# Patient Record
Sex: FEMALE | Race: WHITE | NOT HISPANIC OR LATINO | URBAN - METROPOLITAN AREA
[De-identification: names, ages, dates, MRNs, and addresses within clinical notes are randomized per-mention and may not be internally consistent; named-entity substitution may affect disease eponyms.]

---

## 2017-05-08 ENCOUNTER — OUTPATIENT (OUTPATIENT)
Dept: OUTPATIENT SERVICES | Facility: HOSPITAL | Age: 52
LOS: 1 days | End: 2017-05-08

## 2017-05-11 ENCOUNTER — INPATIENT (INPATIENT)
Facility: HOSPITAL | Age: 52
LOS: 4 days | Discharge: HOME | End: 2017-05-16
Payer: COMMERCIAL

## 2017-05-13 PROCEDURE — 71020: CPT | Mod: 26

## 2018-08-12 ENCOUNTER — TRANSCRIPTION ENCOUNTER (OUTPATIENT)
Age: 53
End: 2018-08-12

## 2018-11-26 ENCOUNTER — TRANSCRIPTION ENCOUNTER (OUTPATIENT)
Age: 53
End: 2018-11-26

## 2019-05-21 ENCOUNTER — APPOINTMENT (OUTPATIENT)
Dept: OBGYN | Facility: CLINIC | Age: 54
End: 2019-05-21
Payer: COMMERCIAL

## 2019-05-21 ENCOUNTER — APPOINTMENT (OUTPATIENT)
Dept: OBGYN | Facility: CLINIC | Age: 54
End: 2019-05-21

## 2019-05-21 VITALS
SYSTOLIC BLOOD PRESSURE: 118 MMHG | WEIGHT: 199 LBS | BODY MASS INDEX: 35.26 KG/M2 | HEIGHT: 63 IN | DIASTOLIC BLOOD PRESSURE: 70 MMHG

## 2019-05-21 DIAGNOSIS — Z87.440 PERSONAL HISTORY OF URINARY (TRACT) INFECTIONS: ICD-10-CM

## 2019-05-21 DIAGNOSIS — Z80.8 FAMILY HISTORY OF MALIGNANT NEOPLASM OF OTHER ORGANS OR SYSTEMS: ICD-10-CM

## 2019-05-21 DIAGNOSIS — Z80.0 FAMILY HISTORY OF MALIGNANT NEOPLASM OF DIGESTIVE ORGANS: ICD-10-CM

## 2019-05-21 DIAGNOSIS — Z80.1 FAMILY HISTORY OF MALIGNANT NEOPLASM OF TRACHEA, BRONCHUS AND LUNG: ICD-10-CM

## 2019-05-21 DIAGNOSIS — Z87.39 PERSONAL HISTORY OF OTHER DISEASES OF THE MUSCULOSKELETAL SYSTEM AND CONNECTIVE TISSUE: ICD-10-CM

## 2019-05-21 DIAGNOSIS — Z98.890 OTHER SPECIFIED POSTPROCEDURAL STATES: ICD-10-CM

## 2019-05-21 DIAGNOSIS — Z80.52 FAMILY HISTORY OF MALIGNANT NEOPLASM OF BLADDER: ICD-10-CM

## 2019-05-21 DIAGNOSIS — Z78.9 OTHER SPECIFIED HEALTH STATUS: ICD-10-CM

## 2019-05-21 DIAGNOSIS — Z80.3 FAMILY HISTORY OF MALIGNANT NEOPLASM OF BREAST: ICD-10-CM

## 2019-05-21 DIAGNOSIS — Z80.6 FAMILY HISTORY OF LEUKEMIA: ICD-10-CM

## 2019-05-21 DIAGNOSIS — Z80.7 FAMILY HISTORY OF OTHER MALIGNANT NEOPLASMS OF LYMPHOID, HEMATOPOIETIC AND RELATED TISSUES: ICD-10-CM

## 2019-05-21 DIAGNOSIS — Z85.820 PERSONAL HISTORY OF MALIGNANT MELANOMA OF SKIN: ICD-10-CM

## 2019-05-21 DIAGNOSIS — M72.2 PLANTAR FASCIAL FIBROMATOSIS: ICD-10-CM

## 2019-05-21 PROCEDURE — 99396 PREV VISIT EST AGE 40-64: CPT

## 2019-05-21 RX ORDER — MULTIVIT-MIN/FOLIC/VIT K/LYCOP 400-300MCG
TABLET ORAL
Refills: 0 | Status: ACTIVE | COMMUNITY

## 2019-05-21 NOTE — PHYSICAL EXAM
[Awake] : awake [Alert] : alert [Acute Distress] : no acute distress [Mass] : no breast mass [Nipple Discharge] : no nipple discharge [Axillary LAD] : no axillary lymphadenopathy [Soft] : soft [Tender] : non tender [Oriented x3] : oriented to person, place, and time [Normal] : uterus [No Bleeding] : there was no active vaginal bleeding [Uterine Adnexae] : were not tender and not enlarged [FreeTextEntry7] : hystrectomy

## 2019-05-21 NOTE — DISCUSSION/SUMMARY
[FreeTextEntry1] : a54 years old white female into the office for annual exam patient is 2 years post hysterectomy feeling well offers no complaints physical on pelvic exam within normal limits Pap smear done patient is on Premarin cream I spent 25 minutes with the patient

## 2019-05-24 ENCOUNTER — TRANSCRIPTION ENCOUNTER (OUTPATIENT)
Age: 54
End: 2019-05-24

## 2019-05-26 ENCOUNTER — RX RENEWAL (OUTPATIENT)
Age: 54
End: 2019-05-26

## 2019-05-26 LAB
CYTOLOGY CVX/VAG DOC THIN PREP: NORMAL
HPV HIGH+LOW RISK DNA PNL CVX: NOT DETECTED

## 2020-04-26 ENCOUNTER — MESSAGE (OUTPATIENT)
Age: 55
End: 2020-04-26

## 2020-05-12 ENCOUNTER — APPOINTMENT (OUTPATIENT)
Dept: DISASTER EMERGENCY | Facility: HOSPITAL | Age: 55
End: 2020-05-12

## 2020-05-12 LAB
SARS-COV-2 IGG SERPL IA-ACNC: 0.3 INDEX
SARS-COV-2 IGG SERPL QL IA: NEGATIVE

## 2020-07-28 ENCOUNTER — APPOINTMENT (OUTPATIENT)
Dept: OBGYN | Facility: CLINIC | Age: 55
End: 2020-07-28
Payer: COMMERCIAL

## 2020-07-28 VITALS
WEIGHT: 189 LBS | SYSTOLIC BLOOD PRESSURE: 122 MMHG | DIASTOLIC BLOOD PRESSURE: 78 MMHG | BODY MASS INDEX: 33.49 KG/M2 | TEMPERATURE: 97.2 F | HEIGHT: 63 IN

## 2020-07-28 DIAGNOSIS — Z82.49 FAMILY HISTORY OF ISCHEMIC HEART DISEASE AND OTHER DISEASES OF THE CIRCULATORY SYSTEM: ICD-10-CM

## 2020-07-28 DIAGNOSIS — Z82.62 FAMILY HISTORY OF OSTEOPOROSIS: ICD-10-CM

## 2020-07-28 DIAGNOSIS — Z83.49 FAMILY HISTORY OF OTHER ENDOCRINE, NUTRITIONAL AND METABOLIC DISEASES: ICD-10-CM

## 2020-07-28 DIAGNOSIS — Z00.00 ENCOUNTER FOR GENERAL ADULT MEDICAL EXAMINATION W/OUT ABNORMAL FINDINGS: ICD-10-CM

## 2020-07-28 DIAGNOSIS — Z82.0 FAMILY HISTORY OF EPILEPSY AND OTHER DISEASES OF THE NERVOUS SYSTEM: ICD-10-CM

## 2020-07-28 LAB
DATE COLLECTED: NORMAL
HEMOCCULT SP1 STL QL: NEGATIVE
QUALITY CONTROL: YES

## 2020-07-28 PROCEDURE — 99396 PREV VISIT EST AGE 40-64: CPT

## 2020-07-28 PROCEDURE — 82274 ASSAY TEST FOR BLOOD FECAL: CPT | Mod: NC,QW

## 2020-07-28 RX ORDER — LISDEXAMFETAMINE DIMESYLATE 10 MG/1
CAPSULE ORAL
Refills: 0 | Status: ACTIVE | COMMUNITY

## 2020-07-28 RX ORDER — CELECOXIB 50 MG/1
CAPSULE ORAL
Refills: 0 | Status: DISCONTINUED | COMMUNITY
End: 2020-07-28

## 2020-07-28 RX ORDER — MONTELUKAST SODIUM 10 MG/1
10 TABLET, FILM COATED ORAL
Refills: 0 | Status: DISCONTINUED | COMMUNITY
End: 2020-07-28
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
## 2020-07-28 NOTE — PHYSICAL EXAM
[Awake] : awake [Alert] : alert [Acute Distress] : no acute distress [Mass] : no breast mass [Nipple Discharge] : no nipple discharge [Axillary LAD] : no axillary lymphadenopathy [Soft] : soft [Tender] : non tender [Oriented x3] : oriented to person, place, and time [Normal] : vagina [No Bleeding] : there was no active vaginal bleeding [Absent] : absent [Uterine Adnexae] : were not tender and not enlarged [No Tenderness] : no rectal tenderness [CTAB] : CTAB [Occult Blood] : occult blood test from digital rectal exam was negative [RRR, No Murmurs] : RRR, no murmurs

## 2020-07-29 LAB — HPV HIGH+LOW RISK DNA PNL CVX: NOT DETECTED

## 2020-07-31 LAB — CYTOLOGY CVX/VAG DOC THIN PREP: NORMAL

## 2020-12-18 ENCOUNTER — APPOINTMENT (OUTPATIENT)
Dept: OBGYN | Facility: CLINIC | Age: 55
End: 2020-12-18
Payer: COMMERCIAL

## 2020-12-18 VITALS
WEIGHT: 189 LBS | DIASTOLIC BLOOD PRESSURE: 72 MMHG | BODY MASS INDEX: 27.99 KG/M2 | SYSTOLIC BLOOD PRESSURE: 118 MMHG | HEIGHT: 69 IN

## 2020-12-18 DIAGNOSIS — Z13.71 ENCOUNTER FOR NONPROCREATIVE SCREENING FOR GENETIC DISEASE CARRIER STATUS: ICD-10-CM

## 2020-12-18 PROCEDURE — 99072 ADDL SUPL MATRL&STAF TM PHE: CPT

## 2020-12-18 PROCEDURE — 99212 OFFICE O/P EST SF 10 MIN: CPT

## 2020-12-18 NOTE — DISCUSSION/SUMMARY
[FreeTextEntry1] : DIscussed our current genetic test panel which tests for the test BRCA 1+2 and the TP53 mutation.\par Collecting blood today and will send to [facility]

## 2020-12-18 NOTE — HISTORY OF PRESENT ILLNESS
[FreeTextEntry1] : Yazmin presents today for genetic testing having learned recently that her sister is + for the TP53 gene mutation. That sister has breast cancer. She has a lot of cancer in her family hx including, breast, colon, skin,lung,lymphoma and bladder.\par Pt is up to date with her annual and her routine screening

## 2021-01-15 DIAGNOSIS — Z79.890 HORMONE REPLACEMENT THERAPY: ICD-10-CM

## 2021-04-19 ENCOUNTER — RX RENEWAL (OUTPATIENT)
Age: 56
End: 2021-04-19

## 2021-04-27 ENCOUNTER — RESULT REVIEW (OUTPATIENT)
Age: 56
End: 2021-04-27

## 2021-04-27 ENCOUNTER — TRANSCRIPTION ENCOUNTER (OUTPATIENT)
Age: 56
End: 2021-04-27

## 2021-07-26 ENCOUNTER — NON-APPOINTMENT (OUTPATIENT)
Age: 56
End: 2021-07-26

## 2021-07-31 ENCOUNTER — TRANSCRIPTION ENCOUNTER (OUTPATIENT)
Age: 56
End: 2021-07-31

## 2021-08-17 ENCOUNTER — APPOINTMENT (OUTPATIENT)
Dept: OBGYN | Facility: CLINIC | Age: 56
End: 2021-08-17
Payer: COMMERCIAL

## 2021-08-17 VITALS
DIASTOLIC BLOOD PRESSURE: 72 MMHG | BODY MASS INDEX: 34.55 KG/M2 | HEIGHT: 63 IN | SYSTOLIC BLOOD PRESSURE: 120 MMHG | WEIGHT: 195 LBS

## 2021-08-17 DIAGNOSIS — Z87.820 PERSONAL HISTORY OF TRAUMATIC BRAIN INJURY: ICD-10-CM

## 2021-08-17 DIAGNOSIS — Z86.718 PERSONAL HISTORY OF OTHER VENOUS THROMBOSIS AND EMBOLISM: ICD-10-CM

## 2021-08-17 DIAGNOSIS — U07.1 COVID-19: ICD-10-CM

## 2021-08-17 DIAGNOSIS — N95.1 MENOPAUSAL AND FEMALE CLIMACTERIC STATES: ICD-10-CM

## 2021-08-17 LAB
DATE COLLECTED: NORMAL
HEMOCCULT SP1 STL QL: NEGATIVE
QUALITY CONTROL: YES

## 2021-08-17 PROCEDURE — 99396 PREV VISIT EST AGE 40-64: CPT

## 2021-08-17 PROCEDURE — 99212 OFFICE O/P EST SF 10 MIN: CPT | Mod: 25

## 2021-08-17 PROCEDURE — 82270 OCCULT BLOOD FECES: CPT

## 2021-08-17 RX ORDER — ESTROGENS, CONJUGATED 0.45 MG/1
0.45 TABLET, FILM COATED ORAL
Qty: 90 | Refills: 0 | Status: DISCONTINUED | COMMUNITY
Start: 2021-01-15 | End: 2021-08-17

## 2021-08-17 RX ORDER — ESTROGENS, CONJUGATED 0.62 MG/1
0.62 TABLET, FILM COATED ORAL DAILY
Qty: 90 | Refills: 0 | Status: DISCONTINUED | COMMUNITY
Start: 2019-05-26 | End: 2021-08-17

## 2021-08-17 NOTE — PHYSICAL EXAM
[Appropriately responsive] : appropriately responsive [Alert] : alert [No Acute Distress] : no acute distress [No Lymphadenopathy] : no lymphadenopathy [Regular Rate Rhythm] : regular rate rhythm [No Murmurs] : no murmurs [Clear to Auscultation B/L] : clear to auscultation bilaterally [Soft] : soft [Non-tender] : non-tender [Non-distended] : non-distended [No HSM] : No HSM [No Lesions] : no lesions [No Mass] : no mass [Oriented x3] : oriented x3 [Examination Of The Breasts] : a normal appearance [No Masses] : no breast masses were palpable [Labia Majora] : normal [Labia Minora] : normal [Normal] : normal [Uterine Adnexae] : normal [FreeTextEntry9] : Neg

## 2021-08-17 NOTE — DISCUSSION/SUMMARY
[FreeTextEntry1] : 57yo s/p SHAISTA/BSO here for annual visit. Pt has hot flushes and has been on premarin however has a h/o upper extremity DVT. \par \par Hot flushes: \par - Started on Brisdelle daily \par - referred to [facility] (dr Horne) for Thrombophilia work up and determination if it is ok for her to resume oral premarin\par \par FHx of LS cancer: \par - Pt is s/p genetic testing \par - Pt has two VUS which we will follow up with [facility] about -- otherwise she is neg. \par \par RHM: \par - DVS neg x 3\par - Dentist, PCP, Derm as discussed \par - Rx given for DEXA, mammo/sono\par - Colonoscopy as discussed \par - Offered STI screen today. Pt declined \par - Encouraged healthy diet, exercise, weight loss\par \par Evelyn Ramos MD \par Obstetrician/Gynecologist\par \par

## 2021-08-17 NOTE — HISTORY OF PRESENT ILLNESS
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 Since her last visit she had foot surgery for a ruptured plantar fascia and then a concussion after falling down a flight of stairs and then obtain COVID (she was vaccinated).   [Mammogramdate] : Aug 2020 [PapSmeardate] : 7/2020

## 2021-08-20 LAB — HPV HIGH+LOW RISK DNA PNL CVX: NOT DETECTED

## 2021-08-23 LAB — CYTOLOGY CVX/VAG DOC THIN PREP: NORMAL

## 2021-09-02 ENCOUNTER — OUTPATIENT (OUTPATIENT)
Dept: OUTPATIENT SERVICES | Facility: HOSPITAL | Age: 56
LOS: 1 days | End: 2021-09-02

## 2021-09-02 ENCOUNTER — APPOINTMENT (OUTPATIENT)
Dept: HEMATOLOGY ONCOLOGY | Facility: CLINIC | Age: 56
End: 2021-09-02
Payer: COMMERCIAL

## 2021-09-02 VITALS
DIASTOLIC BLOOD PRESSURE: 74 MMHG | TEMPERATURE: 97.8 F | BODY MASS INDEX: 35.11 KG/M2 | HEIGHT: 63 IN | OXYGEN SATURATION: 99 % | WEIGHT: 198.13 LBS | HEART RATE: 61 BPM | SYSTOLIC BLOOD PRESSURE: 109 MMHG

## 2021-09-02 DIAGNOSIS — Z80.7 FAMILY HISTORY OF OTHER MALIGNANT NEOPLASMS OF LYMPHOID, HEMATOPOIETIC AND RELATED TISSUES: ICD-10-CM

## 2021-09-02 DIAGNOSIS — Z87.442 PERSONAL HISTORY OF URINARY CALCULI: ICD-10-CM

## 2021-09-02 DIAGNOSIS — Z82.49 FAMILY HISTORY OF ISCHEMIC HEART DISEASE AND OTHER DISEASES OF THE CIRCULATORY SYSTEM: ICD-10-CM

## 2021-09-02 DIAGNOSIS — Z86.718 PERSONAL HISTORY OF OTHER VENOUS THROMBOSIS AND EMBOLISM: ICD-10-CM

## 2021-09-02 DIAGNOSIS — Z82.0 FAMILY HISTORY OF EPILEPSY AND OTHER DISEASES OF THE NERVOUS SYSTEM: ICD-10-CM

## 2021-09-02 DIAGNOSIS — Z87.898 PERSONAL HISTORY OF OTHER SPECIFIED CONDITIONS: ICD-10-CM

## 2021-09-02 PROCEDURE — 99204 OFFICE O/P NEW MOD 45 MIN: CPT

## 2021-09-02 RX ORDER — METHYLDOPA/HYDROCHLOROTHIAZIDE 250MG-15MG
TABLET ORAL
Refills: 0 | Status: ACTIVE | COMMUNITY

## 2021-09-02 RX ORDER — PAROXETINE 7.5 MG/1
7.5 CAPSULE ORAL
Qty: 3 | Refills: 0 | Status: DISCONTINUED | COMMUNITY
Start: 2021-08-17 | End: 2021-09-02

## 2021-09-02 RX ORDER — OMEGA-3/DHA/EPA/FISH OIL 300-1000MG
CAPSULE ORAL
Refills: 0 | Status: ACTIVE | COMMUNITY

## 2021-09-02 RX ORDER — VITAMIN E 200 UNIT
500 CAPSULE ORAL
Refills: 0 | Status: ACTIVE | COMMUNITY

## 2021-09-02 RX ORDER — IBUPROFEN 200 MG
TABLET ORAL
Refills: 0 | Status: ACTIVE | COMMUNITY

## 2021-09-02 RX ORDER — OMEPRAZOLE 20 MG/1
20 CAPSULE, DELAYED RELEASE ORAL
Refills: 0 | Status: ACTIVE | COMMUNITY

## 2021-09-02 RX ORDER — CETIRIZINE HYDROCHLORIDE 10 MG/1
CAPSULE, LIQUID FILLED ORAL
Refills: 0 | Status: DISCONTINUED | COMMUNITY
End: 2021-09-02

## 2021-09-02 NOTE — HISTORY OF PRESENT ILLNESS
[de-identified] : Referred by: Dr. Evelyn Mauricio (OB/GYN) \par \par Ms. Celis presented at age 56 in 2021 for evaluation of history of DVT and consideration for safety for premarin. \par She has a medical history of fibromyalgia. \par \par The patient has a history of upper extremity DVT which occurred after being hospitalized for a kidney infection at the site of her IV line. She believes that she developed one superficial and one deep clot. She was admitted and received heparin/warfarin, followed by Xarelto for 1 month. She then underwent a hysterectomy in  for fibroids. and at that time was placed on premarin. Her gynecologist recently recommended stopping the premarin in early  her history of DVT.  At that time she was started on paroxetine (Brisdelle) for management of hot flashes but this did not help so she restarted the premarin a few times per week.\par Of note, she was vaccinated with Pfizer in January, recently got COVID in July, fully recovered. She is otherwise feeling well, no other history of DVT. Her mother had a DVT while she was elderly and bedridden. \par She denies fevers, chills, cough, SOB. She has normal appetite. 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

## 2021-09-02 NOTE — RESULTS/DATA
[FreeTextEntry1] : Ms. Celis presented at age 56 in September 2021 for evaluation of history of DVT and consideration for safety for premarin. \par She has a medical history of fibromyalgia. \par \par We discussed lack of safety of premarin given her history of DVT. Will proceed with alternative medication for her hot flashes.

## 2021-09-02 NOTE — HISTORY OF PRESENT ILLNESS
[de-identified] : Referred by: Dr. Evelyn Mauricio (OB/GYN) \par \par Ms. Celis presented at age 56 in 2021 for evaluation of history of DVT and consideration for safety for premarin. \par She has a medical history of fibromyalgia. \par \par The patient has a history of upper extremity DVT which occurred after being hospitalized for a kidney infection at the site of her IV line. She believes that she developed one superficial and one deep clot. She was admitted and received heparin/warfarin, followed by Xarelto for 1 month. She then underwent a hysterectomy in  for fibroids. and at that time was placed on premarin. Her gynecologist recently recommended stopping the premarin in early  her history of DVT.  At that time she was started on paroxetine (Brisdelle) for management of hot flashes but this did not help so she restarted the premarin a few times per week.\par Of note, she was vaccinated with Pfizer in January, recently got COVID in July, fully recovered. She is otherwise feeling well, no other history of DVT. Her mother had a DVT while she was elderly and bedridden. \par She denies fevers, chills, cough, SOB. She has normal appetite. \par \par SH: \par - Lives with  and her daughter, son and 3 daughters\par - Never smoker, social etoh\par - [place] at [employer] [place] \par \par FH: \par Older sister had breast cancer age 54\par Cousin  of leukemia \par Cousins son had NHL at age 11, stage 4 colon cancer at age 27, \par Aunt splenic lymphoma\par Uncle with metastatic bladder cancer\par \par Her mother had extensive DVT while she was bedridden. 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

## 2021-09-02 NOTE — CONSULT LETTER
[Dear  ___] : Dear  [unfilled], [Consult Letter:] : I had the pleasure of evaluating your patient, [unfilled]. [Please see my note below.] : Please see my note below. [Consult Closing:] : Thank you very much for allowing me to participate in the care of this patient.  If you have any questions, please do not hesitate to contact me. [Sincerely,] : Sincerely, [FreeTextEntry2] : Dr. Evelyn Mauricio [FreeTextEntry3] : Dr. Vaishali Burgos

## 2021-10-04 ENCOUNTER — RESULT REVIEW (OUTPATIENT)
Age: 56
End: 2021-10-04

## 2021-10-04 ENCOUNTER — OUTPATIENT (OUTPATIENT)
Dept: OUTPATIENT SERVICES | Facility: HOSPITAL | Age: 56
LOS: 1 days | End: 2021-10-04
Payer: COMMERCIAL

## 2021-10-04 ENCOUNTER — APPOINTMENT (OUTPATIENT)
Dept: HEMATOLOGY ONCOLOGY | Facility: CLINIC | Age: 56
End: 2021-10-04
Payer: COMMERCIAL

## 2021-10-04 VITALS
HEART RATE: 65 BPM | HEIGHT: 64 IN | WEIGHT: 201.05 LBS | SYSTOLIC BLOOD PRESSURE: 126 MMHG | BODY MASS INDEX: 34.32 KG/M2 | TEMPERATURE: 97 F | DIASTOLIC BLOOD PRESSURE: 85 MMHG | OXYGEN SATURATION: 100 %

## 2021-10-04 DIAGNOSIS — Z86.718 PERSONAL HISTORY OF OTHER VENOUS THROMBOSIS AND EMBOLISM: ICD-10-CM

## 2021-10-04 DIAGNOSIS — I82.409 ACUTE EMBOLISM AND THROMBOSIS OF UNSPECIFIED DEEP VEINS OF UNSPECIFIED LOWER EXTREMITY: ICD-10-CM

## 2021-10-04 LAB
BASOPHILS # BLD AUTO: 0.07 K/UL — SIGNIFICANT CHANGE UP (ref 0–0.2)
BASOPHILS NFR BLD AUTO: 1.1 % — SIGNIFICANT CHANGE UP (ref 0–2)
EOSINOPHIL # BLD AUTO: 0.14 K/UL — SIGNIFICANT CHANGE UP (ref 0–0.5)
EOSINOPHIL NFR BLD AUTO: 2.1 % — SIGNIFICANT CHANGE UP (ref 0–6)
HCT VFR BLD CALC: 37.9 % — SIGNIFICANT CHANGE UP (ref 34.5–45)
HGB BLD-MCNC: 13 G/DL — SIGNIFICANT CHANGE UP (ref 11.5–15.5)
IMM GRANULOCYTES NFR BLD AUTO: 0.3 % — SIGNIFICANT CHANGE UP (ref 0–1.5)
LYMPHOCYTES # BLD AUTO: 1.86 K/UL — SIGNIFICANT CHANGE UP (ref 1–3.3)
LYMPHOCYTES # BLD AUTO: 28.4 % — SIGNIFICANT CHANGE UP (ref 13–44)
MCHC RBC-ENTMCNC: 31.6 PG — SIGNIFICANT CHANGE UP (ref 27–34)
MCHC RBC-ENTMCNC: 34.3 GM/DL — SIGNIFICANT CHANGE UP (ref 32–36)
MCV RBC AUTO: 92.2 FL — SIGNIFICANT CHANGE UP (ref 80–100)
MONOCYTES # BLD AUTO: 0.63 K/UL — SIGNIFICANT CHANGE UP (ref 0–0.9)
MONOCYTES NFR BLD AUTO: 9.6 % — SIGNIFICANT CHANGE UP (ref 2–14)
NEUTROPHILS # BLD AUTO: 3.84 K/UL — SIGNIFICANT CHANGE UP (ref 1.8–7.4)
NEUTROPHILS NFR BLD AUTO: 58.5 % — SIGNIFICANT CHANGE UP (ref 43–77)
NRBC # BLD: 0 /100 WBCS — SIGNIFICANT CHANGE UP (ref 0–0)
PLATELET # BLD AUTO: 310 K/UL — SIGNIFICANT CHANGE UP (ref 150–400)
RBC # BLD: 4.11 M/UL — SIGNIFICANT CHANGE UP (ref 3.8–5.2)
RBC # FLD: 12.1 % — SIGNIFICANT CHANGE UP (ref 10.3–14.5)
WBC # BLD: 6.56 K/UL — SIGNIFICANT CHANGE UP (ref 3.8–10.5)
WBC # FLD AUTO: 6.56 K/UL — SIGNIFICANT CHANGE UP (ref 3.8–10.5)

## 2021-10-04 PROCEDURE — G0452: CPT | Mod: 26

## 2021-10-04 PROCEDURE — 99213 OFFICE O/P EST LOW 20 MIN: CPT

## 2021-10-04 PROCEDURE — 81240 F2 GENE: CPT

## 2021-10-04 PROCEDURE — 81241 F5 GENE: CPT

## 2021-10-04 PROCEDURE — 85027 COMPLETE CBC AUTOMATED: CPT

## 2021-10-04 RX ORDER — FLUOROURACIL 50 MG/G
5 CREAM TOPICAL
Qty: 40 | Refills: 0 | Status: ACTIVE | COMMUNITY
Start: 2021-07-28

## 2021-10-04 RX ORDER — TRAMADOL HYDROCHLORIDE 50 MG/1
50 TABLET, COATED ORAL
Qty: 30 | Refills: 0 | Status: ACTIVE | COMMUNITY
Start: 2021-04-22

## 2021-10-04 RX ORDER — MELOXICAM 15 MG/1
15 TABLET ORAL
Qty: 30 | Refills: 0 | Status: ACTIVE | COMMUNITY
Start: 2021-04-20

## 2021-10-04 RX ORDER — METHYLPREDNISOLONE 4 MG/1
4 TABLET ORAL
Qty: 21 | Refills: 0 | Status: ACTIVE | COMMUNITY
Start: 2021-06-01

## 2021-10-04 NOTE — HISTORY OF PRESENT ILLNESS
[de-identified] : Referred by: Dr. Evelyn Mauricio (OB/GYN) \par \par Ms. Celis presented at age 56 in 2021 for evaluation of history of DVT and consideration for safety for premarin. \par She has a medical history of fibromyalgia. \par \par Presenting HPI: The patient has a history of upper extremity DVT which occurred after being hospitalized for a kidney infection at the site of her IV line. She believes that she developed one superficial and one deep clot. She was admitted and received heparin/warfarin, followed by Xarelto for 1 month. She then underwent a hysterectomy with oophorectomy in  for fibroids. and at that time was placed on premarin. Her gynecologist recently recommended stopping the premarin in early  her history of DVT.  At that time she was started on paroxetine (Brisdelle) for management of hot flashes but this did not help so she restarted the premarin a few times per week.\par Of note, she was vaccinated with Pfizer in January, recently got COVID in July, fully recovered. She is otherwise feeling well, no other history of DVT. Her mother had a DVT while she was elderly and bedridden. \par She denies fevers, chills, cough, SOB. She has normal appetite. 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  [de-identified] : Follow up 10/1/21: \par Patient here for follow up after recently starting low dose oxybutynin for hot flashes. \par She states since stopping the premarin her hot flashes have been even worse despite the oxybutynin- experiencing hot flashes every hour and waking her up at night.  She also believes she has gained a few pounds since stopping the Premarin.  She is otherwise feeling well- reports good energy and appetite. \par Mammogram due this month. \par She denies any recent headaches, fevers or chills, chest pain, shortness of breath, abdominal pain, nausea, vomiting, diarrhea, lower extremity edema, weight loss.\par

## 2021-10-06 ENCOUNTER — TRANSCRIPTION ENCOUNTER (OUTPATIENT)
Age: 56
End: 2021-10-06

## 2021-10-07 LAB
DNA PLOIDY SPEC FC-IMP: SIGNIFICANT CHANGE UP
PTR INTERPRETATION: SIGNIFICANT CHANGE UP

## 2021-11-01 ENCOUNTER — RX RENEWAL (OUTPATIENT)
Age: 56
End: 2021-11-01

## 2021-11-16 ENCOUNTER — OUTPATIENT (OUTPATIENT)
Dept: OUTPATIENT SERVICES | Facility: HOSPITAL | Age: 56
LOS: 1 days | End: 2021-11-16

## 2021-11-16 ENCOUNTER — APPOINTMENT (OUTPATIENT)
Dept: HEMATOLOGY ONCOLOGY | Facility: CLINIC | Age: 56
End: 2021-11-16
Payer: COMMERCIAL

## 2021-11-16 VITALS
TEMPERATURE: 97.2 F | DIASTOLIC BLOOD PRESSURE: 79 MMHG | OXYGEN SATURATION: 96 % | BODY MASS INDEX: 35.22 KG/M2 | SYSTOLIC BLOOD PRESSURE: 115 MMHG | HEART RATE: 62 BPM | WEIGHT: 201.28 LBS | HEIGHT: 63.5 IN

## 2021-11-16 DIAGNOSIS — Z86.718 PERSONAL HISTORY OF OTHER VENOUS THROMBOSIS AND EMBOLISM: ICD-10-CM

## 2021-11-16 PROCEDURE — 99213 OFFICE O/P EST LOW 20 MIN: CPT

## 2021-11-16 RX ORDER — OXYBUTYNIN CHLORIDE 5 MG/1
5 TABLET ORAL
Qty: 60 | Refills: 5 | Status: DISCONTINUED | COMMUNITY
Start: 2021-09-02 | End: 2021-11-16

## 2021-11-16 RX ORDER — PAROXETINE 7.5 MG/1
7.5 CAPSULE ORAL DAILY
Qty: 30 | Refills: 4 | Status: ACTIVE | COMMUNITY
Start: 2021-11-16 | End: 1900-01-01

## 2021-11-16 NOTE — PHYSICAL EXAM
[Fully active, able to carry on all pre-disease performance without restriction] : Status 0 - Fully active, able to carry on all pre-disease performance without restriction [Normal] : affect appropriate [de-identified] : Generally well appearing female, NAD

## 2021-11-16 NOTE — RESULTS/DATA
[FreeTextEntry1] : Ms. Celis presented at age 56 in September 2021 for evaluation of history of DVT and consideration for safety for premarin. \par She has a medical history of fibromyalgia. \par \par We discussed lack of safety of premarin given her history of DVT. Will proceed with alternative medication for her hot flashes. \par \par Failed trial of low dose oxybutynin. Will retry paroxetine. \par Continue acupuncture.

## 2021-11-16 NOTE — HISTORY OF PRESENT ILLNESS
[de-identified] : Referred by: Dr. Evelyn Mauricio (OB/GYN) \par \par Ms. Celis presented at age 56 in 2021 for evaluation of history of DVT and consideration for safety for premarin. \par She has a medical history of fibromyalgia. \par \par Presenting HPI: The patient has a history of upper extremity DVT which occurred after being hospitalized for a kidney infection at the site of her IV line. She believes that she developed one superficial and one deep clot. She was admitted and received heparin/warfarin, followed by Xarelto for 1 month. She then underwent a hysterectomy with oophorectomy in  for fibroids. and at that time was placed on premarin. Her gynecologist recently recommended stopping the premarin in early  her history of DVT.  At that time she was started on paroxetine (Brisdelle) for management of hot flashes but this did not help so she restarted the premarin a few times per week.\par Of note, she was vaccinated with Pfizer in January, recently got COVID in July, fully recovered. She is otherwise feeling well, no other history of DVT. Her mother had a DVT while she was elderly and bedridden. \par She denies fevers, chills, cough, SOB. She has normal appetite. 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  [de-identified] : The patient presents for follow up on 11/16/21 after recently starting low dose oxybutynin for hot flashes. \par Factor V leiden negative. Prothrombin gene mutation negative. \par \par She reports that the higher dose oxybutynin helped with her hot flashes somewhat but she has experienced mouth dryness and would like to discontinue the medication. She reports still having the hot flashes every hour. She otherwise feels well- reports good energy and appetite. \par Mammogram due this month. \par She denies any recent headaches, fevers or chills, chest pain, shortness of breath, abdominal pain, nausea, vomiting, diarrhea, lower extremity edema, weight loss.\par

## 2021-11-16 NOTE — CONSULT LETTER
[Dear  ___] : Dear  [unfilled], [Courtesy Letter:] : I had the pleasure of seeing your patient, [unfilled], in my office today. [Please see my note below.] : Please see my note below. [Consult Closing:] : Thank you very much for allowing me to participate in the care of this patient.  If you have any questions, please do not hesitate to contact me. [Sincerely,] : Sincerely, [FreeTextEntry2] : Dr. Evelyn Mauricio  [FreeTextEntry3] : Dr. Vaishali Burgos\par

## 2021-12-16 ENCOUNTER — OUTPATIENT (OUTPATIENT)
Dept: OUTPATIENT SERVICES | Facility: HOSPITAL | Age: 56
LOS: 1 days | End: 2021-12-16

## 2021-12-16 DIAGNOSIS — Z86.718 PERSONAL HISTORY OF OTHER VENOUS THROMBOSIS AND EMBOLISM: ICD-10-CM

## 2021-12-16 DIAGNOSIS — I82.409 ACUTE EMBOLISM AND THROMBOSIS OF UNSPECIFIED DEEP VEINS OF UNSPECIFIED LOWER EXTREMITY: ICD-10-CM

## 2021-12-17 ENCOUNTER — APPOINTMENT (OUTPATIENT)
Dept: HEMATOLOGY ONCOLOGY | Facility: CLINIC | Age: 56
End: 2021-12-17
Payer: COMMERCIAL

## 2021-12-17 DIAGNOSIS — I82.409 ACUTE EMBOLISM AND THROMBOSIS OF UNSPECIFIED DEEP VEINS OF UNSPECIFIED LOWER EXTREMITY: ICD-10-CM

## 2021-12-17 DIAGNOSIS — N95.1 MENOPAUSAL AND FEMALE CLIMACTERIC STATES: ICD-10-CM

## 2021-12-17 PROCEDURE — 99213 OFFICE O/P EST LOW 20 MIN: CPT

## 2021-12-17 NOTE — PHYSICAL EXAM
[Fully active, able to carry on all pre-disease performance without restriction] : Status 0 - Fully active, able to carry on all pre-disease performance without restriction [Normal] : affect appropriate [de-identified] : Generally well appearing female, NAD

## 2021-12-17 NOTE — HISTORY OF PRESENT ILLNESS
[de-identified] : Referred by: Dr. Evelyn Mauricio (OB/GYN) \par \par Ms. Celis presented at age 56 in 2021 for evaluation of history of DVT and consideration for safety for premarin. \par She has a medical history of fibromyalgia. \par \par Presenting HPI: The patient has a history of upper extremity DVT which occurred after being hospitalized for a kidney infection at the site of her IV line. She believes that she developed one superficial and one deep clot. She was admitted and received heparin/warfarin, followed by Xarelto for 1 month. She then underwent a hysterectomy with oophorectomy in  for fibroids. and at that time was placed on premarin. Her gynecologist recently recommended stopping the premarin in early  her history of DVT.  At that time she was started on paroxetine (Brisdelle) for management of hot flashes but this did not help so she restarted the premarin a few times per week.\par Of note, she was vaccinated with Pfizer in January, recently got COVID in July, fully recovered. She is otherwise feeling well, no other history of DVT. Her mother had a DVT while she was elderly and bedridden. \par She denies fevers, chills, cough, SOB. She has normal appetite. \par \par SH: \par - Lives with  and her daughter, son and 3 daughters\par - Never smoker, social etoh\par - [place] at [employer] [place] \par \par FH: \par Older sister had breast cancer age 54\par Cousin  of leukemia \par Cousins son had NHL at age 11, stage 4 colon cancer at age 27, \par Aunt splenic lymphoma\par Uncle with metastatic bladder cancer\par \par Her mother had extensive DVT while she was bedridden. 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  [de-identified] : The patient presents for follow up on 12/17/21 for history of DVT and hot flashes. Failed trial of low dose oxybutynin. \par Factor V Leiden negative. Prothrombin gene mutation negative. CBC reviewed and within normal limites in 10/2021. \par \par The patient was restarted on paroxetine after the last visit.  She states this helped with her hot flashes however it gave her a flat affect and she just has not felt like herself.  Additionally she reports gaining a few pounds since starting the Paxil.  She would like to discontinue this medication and would prefer to just deal with the hot flashes as they are.  She otherwise reports feeling well and has a good appetite and energy level. She denies any recent headaches, fevers or chills, chest pain, shortness of breath, abdominal pain, nausea, vomiting, diarrhea, lower extremity edema, weight loss.\par

## 2021-12-17 NOTE — RESULTS/DATA
[FreeTextEntry1] : Ms. Celis presented at age 56 in September 2021 for evaluation of history of DVT and consideration for safety for premarin. \par She has a medical history of fibromyalgia. \par \par We discussed lack of safety of premarin given her history of DVT. Will proceed with alternative medication for her hot flashes. \par \par Failed trial of low dose oxybutynin. Wean paxil. \par RTC PRN.

## 2022-01-11 ENCOUNTER — NON-APPOINTMENT (OUTPATIENT)
Age: 57
End: 2022-01-11

## 2022-02-14 ENCOUNTER — TRANSCRIPTION ENCOUNTER (OUTPATIENT)
Age: 57
End: 2022-02-14

## 2022-04-18 ENCOUNTER — TRANSCRIPTION ENCOUNTER (OUTPATIENT)
Age: 57
End: 2022-04-18

## 2022-04-25 ENCOUNTER — TRANSCRIPTION ENCOUNTER (OUTPATIENT)
Age: 57
End: 2022-04-25

## 2022-08-30 ENCOUNTER — APPOINTMENT (OUTPATIENT)
Dept: OBGYN | Facility: CLINIC | Age: 57
End: 2022-08-30

## 2022-08-30 VITALS
SYSTOLIC BLOOD PRESSURE: 124 MMHG | HEIGHT: 63 IN | BODY MASS INDEX: 35.97 KG/M2 | DIASTOLIC BLOOD PRESSURE: 80 MMHG | WEIGHT: 203 LBS

## 2022-08-30 DIAGNOSIS — Z12.39 ENCOUNTER FOR OTHER SCREENING FOR MALIGNANT NEOPLASM OF BREAST: ICD-10-CM

## 2022-08-30 DIAGNOSIS — Z01.419 ENCOUNTER FOR GYNECOLOGICAL EXAMINATION (GENERAL) (ROUTINE) W/OUT ABNORMAL FINDINGS: ICD-10-CM

## 2022-08-30 DIAGNOSIS — Z13.820 ENCOUNTER FOR SCREENING FOR OSTEOPOROSIS: ICD-10-CM

## 2022-08-30 PROCEDURE — 99396 PREV VISIT EST AGE 40-64: CPT

## 2022-08-30 NOTE — DISCUSSION/SUMMARY
[FreeTextEntry1] : unremarkable CBE and pelvic exams\par SBE taught and encouraged monthly\par Pap collected\par DEXA, Mammo and bilateral breast US ordered\par  I reviewed measures for maintaining optimal bone density including dietary intake of 1200 mg calcium and 800 units  of vitamin D daily and 30 minutes of weight bearing exercise for a minimum of 3 x weekly.  Patient given a list of dietary sources of calcium and vitamin D.  Patient verbalizes understanding of these recommendations\par The importance of a screening colonoscopy was discussed.\par

## 2022-08-30 NOTE — PHYSICAL EXAM
[Appropriately responsive] : appropriately responsive [Alert] : alert [No Acute Distress] : no acute distress [No Lymphadenopathy] : no lymphadenopathy [Regular Rate Rhythm] : regular rate rhythm [No Murmurs] : no murmurs [Clear to Auscultation B/L] : clear to auscultation bilaterally [Oriented x3] : oriented x3 [Examination Of The Breasts] : a normal appearance [No Masses] : no breast masses were palpable [Labia Majora] : normal [Labia Minora] : normal [Normal] : normal [Absent] : absent [Uterine Adnexae] : absent

## 2022-08-30 NOTE — HISTORY OF PRESENT ILLNESS
[Patient reported mammogram was normal] : Patient reported mammogram was normal [Patient reported PAP Smear was normal] : Patient reported PAP Smear was normal [Patient reported bone density results were normal] : Patient reported bone density results were normal [postmenopausal] : postmenopausal [Y] : Positive pregnancy history [TextBox_4] : 56 yo s/p SHAISTA/BS for uterine fibroids. Family hx breast CA, patient is BRCA negative. Continues to experience vasomotor sx of menopause.  Taken off Premarin due to Hx blood clots and family hx of same.  Tried SSRI but d/c as it made her feel flat.  Will try acupuncture and Relizen.   [Mammogramdate] : 10/21 [PapSmeardate] : 08/21 [BoneDensityDate] : 10/21 [ColonoscopyDate] : 2018 [LMPDate] : SHAISTA [PGHxTotal] : 4 [[facility]FullTerm] : 4 [[facility] Center[facility]Living] : 4

## 2022-08-31 LAB — HPV HIGH+LOW RISK DNA PNL CVX: NOT DETECTED

## 2022-09-07 LAB — CYTOLOGY CVX/VAG DOC THIN PREP: NORMAL

## 2022-12-07 ENCOUNTER — NON-APPOINTMENT (OUTPATIENT)
Age: 57
End: 2022-12-07

## 2023-02-18 ENCOUNTER — NON-APPOINTMENT (OUTPATIENT)
Age: 58
End: 2023-02-18

## 2023-09-13 ENCOUNTER — APPOINTMENT (OUTPATIENT)
Dept: OBGYN | Facility: CLINIC | Age: 58
End: 2023-09-13
Payer: COMMERCIAL

## 2023-09-13 VITALS
DIASTOLIC BLOOD PRESSURE: 76 MMHG | WEIGHT: 199 LBS | BODY MASS INDEX: 35.26 KG/M2 | HEIGHT: 63 IN | SYSTOLIC BLOOD PRESSURE: 110 MMHG

## 2023-09-13 DIAGNOSIS — Z01.419 ENCOUNTER FOR GYNECOLOGICAL EXAMINATION (GENERAL) (ROUTINE) W/OUT ABNORMAL FINDINGS: ICD-10-CM

## 2023-09-13 PROCEDURE — 99396 PREV VISIT EST AGE 40-64: CPT

## 2023-09-19 LAB — CYTOLOGY CVX/VAG DOC THIN PREP: ABNORMAL

## 2023-12-27 ENCOUNTER — NON-APPOINTMENT (OUTPATIENT)
Age: 58
End: 2023-12-27

## 2024-05-16 ENCOUNTER — APPOINTMENT (OUTPATIENT)
Dept: ORTHOPEDIC SURGERY | Facility: CLINIC | Age: 59
End: 2024-05-16
Payer: COMMERCIAL

## 2024-05-16 VITALS — HEIGHT: 63 IN | BODY MASS INDEX: 32.96 KG/M2 | WEIGHT: 186 LBS

## 2024-05-16 DIAGNOSIS — M76.72 PERONEAL TENDINITIS, LEFT LEG: ICD-10-CM

## 2024-05-16 PROCEDURE — 73610 X-RAY EXAM OF ANKLE: CPT | Mod: LT

## 2024-05-16 PROCEDURE — 99205 OFFICE O/P NEW HI 60 MIN: CPT

## 2024-05-16 NOTE — DATA REVIEWED
[MRI] : MRI [Left] : left [Ankle] : ankle [I independently reviewed and interpreted images and report] : I independently reviewed and interpreted images and report [FreeTextEntry1] : Reta; IMPRESSION: -Interval postsurgical changes with markedly abnormal signal intensity and morphology of the peroneal tendons, progressed in the interim. Non visualization of the inframalleolar peroneus longus tendon 4.2 cm, possibly postoperative in etiology. Peroneus brevis tendon tear involving the insertion. Moderate reactive tenosynovitis. Recommend correlation with operative report.

## 2024-05-16 NOTE — PHYSICAL EXAM
[de-identified] : Examination of the left foot and ankle is as follows: INSPECTION: well healed scar over lateral ankle, no swelling, no ecchymosis, no abrasion, laceration, no erythema PALPATION: ttp over peroneal tendon and peroneal tendon insertion ROM: plantarflexion 40 degrees, inversion 25 degrees, eversion 15 degrees, dorsiflexion 20 degrees STRENGTH: dorsiflexion 5/5, plantar flexion 5/5, inversion 5/5, eversion 5/5, EHL 5/5, FHL 5/5 TESTING: - anterior drawer VASCULAR: dorsalis pedis pulse: 2+, posterior tibialis pulse: 2+ NEURO: Sensation present to light touch in all distributions GAIT: mildly antalgic, LLE short cam boot, patient ambulating without assistive devices  X-rays of the left ankle is as follows:  Ankle 3 view: No fractures, subluxations or dislocations. No major abnormalities.

## 2024-05-16 NOTE — HISTORY OF PRESENT ILLNESS
[Gradual] : gradual [5] : 5 [2] : 2 [Rest] : rest [Meds] : meds [Constant] : constant [Not working due to injury] : Work status: not working due to injury [de-identified] : Patient is here today for second opinion her left ankle. Patient reports that the pain began years ago. NKI. Patient reporting constant aching pain over lateral ankle that is worse with weight bearing. Patient had plantar fascial surgery with removal of os peroneum and peroneal tendon repair 2/2021. Patient completed extensive PT after with initial improvement and then worsening symptoms. Patient reports that she was found to have retorn the tendon with pain. Patient states that she saw Dr. Aguirre in [phone] for left peroneal tendon. Patient had left peroneal tendon repair and tenodesis in 2/2023. Patient states that her pain mildly improved after surgery with PT but continues to have persistent pain and symptoms. Patient states that she went to a Rheumatologist for further evaluation and blood work and testing came back inconclusive for autoimmune/rheumatologic etiologies. Patient came into the office ambulating in cam boot. MRI left ankle at [facility] on 3/12/24.  IMPRESSION: -Interval postsurgical changes with markedly abnormal signal intensity and morphology of the peroneal tendons, progressed in the interim. Non visualization of the inframalleolar peroneus longus tendon 4.2 cm, possibly postoperative in etiology. Peroneus brevis tendon tear involving the insertion. Moderate reactive tenosynovitis. Recommend correlation with operative report. [Dull/Aching] : dull/aching [] : Post Surgical Visit: no [FreeTextEntry1] : Left foot [FreeTextEntry3] : many years [FreeTextEntry5] : NKI [de-identified] : movement [de-identified] : 5/2/24 [de-identified] : Dr. Florentino, Dr. 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 Florentino and Dr. Kramer

## 2024-05-16 NOTE — ASSESSMENT
[FreeTextEntry1] : 60 yo female presenting today with left ankle peroneal tendinitis s/p peroneal tendon tenodesis surgery with Dr. Florentino in 2023. Recommend non-surgical management -Recommend continued therapy -Patient has not improved with PRP injections, do not recommend repeat injections -Activities as tolerated -Rest, ice, compression, elevation, NSAIDs PRN for pain.  -All questions answered -F/u PRN  The diagnosis was explained in detail. The potential non-surgical and surgical treatments were reviewed. The relative risks and benefits of each option were considered relative to the patients age, activity level, medical history, symptom severity and previously attempted treatments.  The patient was advised to consult with their primary medical provider prior to initiation of any new medications to reduce the risk of adverse effects specific to their long-term home medications and medical history. The risk of gastrointestinal irritation and kidney injury specific to long-term NSAID use was discussed.  Entered by Handy Zaldivar PA-C acting as scribe. Dr. Ventura Attestation The documentation recorded by the scribe, in my presence, accurately reflects the service I, Dr. Ventura, personally performed, and the decisions made by me with my edits as appropriate.

## 2025-02-10 ENCOUNTER — NON-APPOINTMENT (OUTPATIENT)
Age: 60
End: 2025-02-10

## 2025-02-12 ENCOUNTER — APPOINTMENT (OUTPATIENT)
Dept: OBGYN | Facility: CLINIC | Age: 60
End: 2025-02-12
Payer: COMMERCIAL

## 2025-02-12 VITALS
HEIGHT: 63 IN | WEIGHT: 186 LBS | SYSTOLIC BLOOD PRESSURE: 113 MMHG | DIASTOLIC BLOOD PRESSURE: 64 MMHG | BODY MASS INDEX: 32.96 KG/M2

## 2025-02-12 DIAGNOSIS — Z01.419 ENCOUNTER FOR GYNECOLOGICAL EXAMINATION (GENERAL) (ROUTINE) W/OUT ABNORMAL FINDINGS: ICD-10-CM

## 2025-02-12 DIAGNOSIS — N95.1 MENOPAUSAL AND FEMALE CLIMACTERIC STATES: ICD-10-CM

## 2025-02-12 PROCEDURE — 99396 PREV VISIT EST AGE 40-64: CPT

## 2025-02-12 RX ORDER — ESTRADIOL 10 UG/1
10 TABLET, FILM COATED VAGINAL
Qty: 24 | Refills: 6 | Status: ACTIVE | COMMUNITY
Start: 2025-02-12 | End: 1900-01-01

## 2025-02-14 LAB — HPV HIGH+LOW RISK DNA PNL CVX: NOT DETECTED

## 2025-02-16 LAB — CYTOLOGY CVX/VAG DOC THIN PREP: ABNORMAL
